# Patient Record
Sex: MALE | Race: WHITE | URBAN - METROPOLITAN AREA
[De-identification: names, ages, dates, MRNs, and addresses within clinical notes are randomized per-mention and may not be internally consistent; named-entity substitution may affect disease eponyms.]

---

## 2019-04-19 ENCOUNTER — Encounter (OUTPATIENT)
Dept: URBAN - METROPOLITAN AREA CLINIC 71 | Facility: CLINIC | Age: 69
End: 2019-04-19
Payer: MEDICARE

## 2019-04-19 PROCEDURE — 92014 COMPRE OPH EXAM EST PT 1/>: CPT | Performed by: OPHTHALMOLOGY

## 2019-04-19 PROCEDURE — 92250 FUNDUS PHOTOGRAPHY W/I&R: CPT | Performed by: OPHTHALMOLOGY

## 2021-04-07 ENCOUNTER — OFFICE VISIT (OUTPATIENT)
Dept: URBAN - METROPOLITAN AREA CLINIC 7 | Facility: CLINIC | Age: 71
End: 2021-04-07
Payer: MEDICARE

## 2021-04-07 DIAGNOSIS — H25.13 AGE-RELATED NUCLEAR CATARACT, BILATERAL: ICD-10-CM

## 2021-04-07 DIAGNOSIS — H35.713 CENTRAL SEROUS CHORIORETINOPATHY, BILATERAL: ICD-10-CM

## 2021-04-07 DIAGNOSIS — H04.123 DRY EYE SYNDROME OF BILATERAL LACRIMAL GLANDS: ICD-10-CM

## 2021-04-07 DIAGNOSIS — H43.813 VITREOUS DEGENERATION, BILATERAL: ICD-10-CM

## 2021-04-07 DIAGNOSIS — H35.3231 EXUDATIVE AGE-REL MCLR DEGN, BI, WITH ACTV CHRDL NEOVAS: Primary | ICD-10-CM

## 2021-04-07 PROCEDURE — 99214 OFFICE O/P EST MOD 30 MIN: CPT | Performed by: OPHTHALMOLOGY

## 2021-04-07 PROCEDURE — 92134 CPTRZ OPH DX IMG PST SGM RTA: CPT | Performed by: OPHTHALMOLOGY

## 2021-04-07 ASSESSMENT — INTRAOCULAR PRESSURE
OD: 15
OS: 17

## 2021-04-07 NOTE — IMPRESSION/PLAN
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
- OD: h/o /AMD, h/o antiVEGF, h/o PDT; last s/p Avastin 1/26/2021 - ([facility])
- OS: s/p Avastin 1/26/2021 Plan: OD: Exam and OCT demonstrate macular scar with improved temporal SRF after Avastin. Recommend Avastin injection OU today. R/B/A discussed, and patient elects to proceed. OS: Patient notes ~4-6 wks of vision improvement after last Avastin. Exam and OCT demonstrate + PED, SRF OS. Recommend Avastin injection today and taper next follow up to 4 wks. Intravitreal Avastin injected OU today without complication. 

RTC 1 mo with OCT OU [facility])